# Patient Record
Sex: MALE | Race: WHITE | NOT HISPANIC OR LATINO | Employment: FULL TIME | URBAN - METROPOLITAN AREA
[De-identification: names, ages, dates, MRNs, and addresses within clinical notes are randomized per-mention and may not be internally consistent; named-entity substitution may affect disease eponyms.]

---

## 2021-04-16 ENCOUNTER — BULK ORDERING (OUTPATIENT)
Dept: CASE MANAGEMENT | Facility: OTHER | Age: 36
End: 2021-04-16

## 2021-04-16 DIAGNOSIS — Z23 IMMUNIZATION DUE: ICD-10-CM

## 2023-11-10 ENCOUNTER — TELEPHONE (OUTPATIENT)
Dept: GASTROENTEROLOGY | Facility: CLINIC | Age: 38
End: 2023-11-10
Payer: COMMERCIAL

## 2023-11-13 ENCOUNTER — PREP FOR SURGERY (OUTPATIENT)
Dept: SURGERY | Facility: SURGERY CENTER | Age: 38
End: 2023-11-13
Payer: COMMERCIAL

## 2023-11-13 DIAGNOSIS — R93.3 ABNORMAL CT SCAN, STOMACH: Primary | ICD-10-CM

## 2023-11-13 RX ORDER — SODIUM CHLORIDE 0.9 % (FLUSH) 0.9 %
10 SYRINGE (ML) INJECTION AS NEEDED
OUTPATIENT
Start: 2023-11-13

## 2023-11-13 RX ORDER — SODIUM CHLORIDE 0.9 % (FLUSH) 0.9 %
3 SYRINGE (ML) INJECTION EVERY 12 HOURS SCHEDULED
OUTPATIENT
Start: 2023-11-13

## 2023-11-13 RX ORDER — SODIUM CHLORIDE, SODIUM LACTATE, POTASSIUM CHLORIDE, CALCIUM CHLORIDE 600; 310; 30; 20 MG/100ML; MG/100ML; MG/100ML; MG/100ML
30 INJECTION, SOLUTION INTRAVENOUS CONTINUOUS PRN
OUTPATIENT
Start: 2023-11-13

## 2023-11-17 ENCOUNTER — OUTSIDE FACILITY SERVICE (OUTPATIENT)
Dept: GASTROENTEROLOGY | Facility: CLINIC | Age: 38
End: 2023-11-17
Payer: COMMERCIAL

## 2023-11-17 ENCOUNTER — LAB REQUISITION (OUTPATIENT)
Dept: LAB | Facility: HOSPITAL | Age: 38
End: 2023-11-17
Payer: COMMERCIAL

## 2023-11-17 DIAGNOSIS — R93.89 ABNORMAL CT SCAN: ICD-10-CM

## 2023-11-17 PROCEDURE — 43239 EGD BIOPSY SINGLE/MULTIPLE: CPT | Performed by: INTERNAL MEDICINE

## 2023-11-17 PROCEDURE — 88305 TISSUE EXAM BY PATHOLOGIST: CPT | Performed by: INTERNAL MEDICINE

## 2023-11-20 LAB
LAB AP CASE REPORT: NORMAL
PATH REPORT.FINAL DX SPEC: NORMAL
PATH REPORT.GROSS SPEC: NORMAL

## 2023-11-29 DIAGNOSIS — K31.7 GASTRIC POLYPS: ICD-10-CM

## 2023-11-29 DIAGNOSIS — K21.00 GASTROESOPHAGEAL REFLUX DISEASE WITH ESOPHAGITIS WITHOUT HEMORRHAGE: Primary | ICD-10-CM

## 2023-11-29 DIAGNOSIS — K29.70 GASTRITIS WITHOUT BLEEDING, UNSPECIFIED CHRONICITY, UNSPECIFIED GASTRITIS TYPE: ICD-10-CM

## 2023-11-29 RX ORDER — PANTOPRAZOLE SODIUM 40 MG/1
40 TABLET, DELAYED RELEASE ORAL DAILY
Qty: 90 TABLET | Refills: 3 | Status: SHIPPED | OUTPATIENT
Start: 2023-11-29

## 2024-01-02 ENCOUNTER — OFFICE VISIT (OUTPATIENT)
Dept: GASTROENTEROLOGY | Facility: CLINIC | Age: 39
End: 2024-01-02
Payer: COMMERCIAL

## 2024-01-02 VITALS
HEART RATE: 68 BPM | BODY MASS INDEX: 40.21 KG/M2 | SYSTOLIC BLOOD PRESSURE: 130 MMHG | HEIGHT: 73 IN | TEMPERATURE: 97 F | OXYGEN SATURATION: 98 % | WEIGHT: 303.4 LBS | DIASTOLIC BLOOD PRESSURE: 80 MMHG

## 2024-01-02 DIAGNOSIS — K21.00 GASTROESOPHAGEAL REFLUX DISEASE WITH ESOPHAGITIS WITHOUT HEMORRHAGE: Primary | ICD-10-CM

## 2024-01-02 DIAGNOSIS — K44.9 HIATAL HERNIA: ICD-10-CM

## 2024-01-02 DIAGNOSIS — K29.70 GASTRITIS WITHOUT BLEEDING, UNSPECIFIED CHRONICITY, UNSPECIFIED GASTRITIS TYPE: ICD-10-CM

## 2024-01-02 PROCEDURE — 99213 OFFICE O/P EST LOW 20 MIN: CPT | Performed by: NURSE PRACTITIONER

## 2024-01-02 NOTE — PROGRESS NOTES
"Chief Complaint   Patient presents with    Follow-up         History of Present Illness  Patient is a 38-year-old male who presents today for Follow-up.  He had a CT scan performed November 2023 with [facility] that showed nodular thickening in the gastric antrum, concerning for gastric ulcer.  EGD was performed to follow-up on this November 2023.  EGD showed LA grade B esophagitis and a slight intermittent hiatal hernia as well as a few gastric polyps and gastritis.  Biopsies were benign.  Negative for Elder's esophagus and negative for H. pylori.  Gastric polyps were benign fundic gland polyps.    Patient presents today for follow-up.  He reports he had not been experiencing any heartburn, reflux, abdominal pain, nausea, or vomiting.  Denies any bowel complaints, blood in the stool, or melena.    He has started pantoprazole but as he was not experiencing any symptoms, has not seen any change in symptoms with this medication.    He does report he was taking Advil regularly prior to EGD which he has since discontinued.       Result Review :       Tissue Pathology Exam (11/17/2023 14:25)    ENDOSCOPY, INT (11/17/2023)    SCANNED - IMAGING (11/06/2023)     Vital Signs:   /80   Pulse 68   Temp 97 °F (36.1 °C)   Ht 185.4 cm (72.99\")   Wt (!) 138 kg (303 lb 6.4 oz)   SpO2 98%   BMI 40.04 kg/m²     Body mass index is 40.04 kg/m².     Physical Exam  Vitals reviewed.   Constitutional:       General: He is not in acute distress.     Appearance: He is well-developed.   HENT:      Head: Normocephalic and atraumatic.   Pulmonary:      Effort: Pulmonary effort is normal. No respiratory distress.   Skin:     General: Skin is dry.      Coloration: Skin is not pale.   Neurological:      Mental Status: He is alert and oriented to person, place, and time.   Psychiatric:         Thought Content: Thought content normal.           Assessment and Plan    Diagnoses and all orders for this visit:    1. Gastroesophageal " reflux disease with esophagitis without hemorrhage (Primary)    2. Gastritis without bleeding, unspecified chronicity, unspecified gastritis type    3. Hiatal hernia         Discussion  Patient presents today for follow-up after EGD.  EGD with evidence of esophagitis and gastritis in the antrum, correlating with recent CT findings.  Gastritis likely secondary to NSAIDs and recommended avoiding NSAIDs.  Recommended continuing pantoprazole daily for 12 weeks to allow for healing of esophagitis and gastritis.  As patient was not experiencing any symptoms, okay to discontinue after that time.  Reviewed dietary modifications to help with reflux at today's office visit.  Patient may follow-up with our office on an as-needed basis and was instructed to call for any new or worsening symptoms.          Follow Up   Return if symptoms worsen or fail to improve.    Patient Instructions   For esophagitis and gastritis, recommend continuing pantoprazole for 12 weeks to allow for healing.    For GERD, follow antireflux precautions.  Recommend avoiding eating within 3 to 4 hours of bedtime.  Avoid foods that can trigger symptoms which may include citrus fruits, spicy foods, tomatoes and red sauces, chocolate, coffee/tea, caffeinated or carbonated beverages, alcohol.     Recommend avoiding NSAID medications. If needed for pain, it is okay to take Tylenol (acetaminophen) available over-the-counter.  Do not exceed recommended daily dose.

## 2024-01-02 NOTE — PATIENT INSTRUCTIONS
For esophagitis and gastritis, recommend continuing pantoprazole for 12 weeks to allow for healing.    For GERD, follow antireflux precautions.  Recommend avoiding eating within 3 to 4 hours of bedtime.  Avoid foods that can trigger symptoms which may include citrus fruits, spicy foods, tomatoes and red sauces, chocolate, coffee/tea, caffeinated or carbonated beverages, alcohol.     Recommend avoiding NSAID medications. If needed for pain, it is okay to take Tylenol (acetaminophen) available over-the-counter.  Do not exceed recommended daily dose.